# Patient Record
Sex: FEMALE | Race: WHITE | Employment: FULL TIME | URBAN - METROPOLITAN AREA
[De-identification: names, ages, dates, MRNs, and addresses within clinical notes are randomized per-mention and may not be internally consistent; named-entity substitution may affect disease eponyms.]

---

## 2018-01-03 PROBLEM — K21.00 GASTROESOPHAGEAL REFLUX DISEASE WITH ESOPHAGITIS: Status: ACTIVE | Noted: 2018-01-03

## 2018-01-03 PROBLEM — Z13.6 SCREENING FOR CARDIOVASCULAR CONDITION: Status: ACTIVE | Noted: 2018-01-03

## 2018-01-03 PROBLEM — F32.A DEPRESSION: Status: ACTIVE | Noted: 2018-01-03

## 2018-01-03 PROBLEM — R53.83 FATIGUE: Status: ACTIVE | Noted: 2018-01-03

## 2018-01-03 PROBLEM — F33.9 RECURRENT DEPRESSION (HCC): Status: ACTIVE | Noted: 2018-01-03

## 2018-07-03 PROBLEM — Z13.6 SCREENING FOR CARDIOVASCULAR CONDITION: Status: RESOLVED | Noted: 2018-01-03 | Resolved: 2018-07-03

## 2018-07-03 PROBLEM — F33.9 RECURRENT DEPRESSION (HCC): Status: RESOLVED | Noted: 2018-01-03 | Resolved: 2018-07-03

## 2018-07-03 PROBLEM — K21.00 GASTROESOPHAGEAL REFLUX DISEASE WITH ESOPHAGITIS: Status: RESOLVED | Noted: 2018-01-03 | Resolved: 2018-07-03

## 2018-07-03 PROBLEM — R00.0 TACHYCARDIA: Status: ACTIVE | Noted: 2018-07-03

## 2018-07-03 PROBLEM — Z34.90 PREGNANCY: Status: ACTIVE | Noted: 2018-07-03

## 2018-07-03 PROBLEM — R53.83 FATIGUE: Status: RESOLVED | Noted: 2018-01-03 | Resolved: 2018-07-03

## 2019-03-27 ENCOUNTER — HOSPITAL ENCOUNTER (OUTPATIENT)
Dept: CT IMAGING | Age: 29
Discharge: HOME | End: 2019-03-27
Attending: FAMILY MEDICINE
Payer: OTHER GOVERNMENT

## 2019-03-27 DIAGNOSIS — Z98.890 STATUS POST SURGERY: ICD-10-CM

## 2019-03-27 DIAGNOSIS — R50.9 FEVER, UNSPECIFIED FEVER CAUSE: ICD-10-CM

## 2019-03-27 DIAGNOSIS — R00.0 TACHYCARDIA: ICD-10-CM

## 2019-03-27 PROBLEM — F41.9 ANXIETY AND DEPRESSION: Status: ACTIVE | Noted: 2019-03-27

## 2019-03-27 PROBLEM — G47.00 INSOMNIA: Status: ACTIVE | Noted: 2019-03-27

## 2019-03-27 PROBLEM — E87.6 LOW SERUM POTASSIUM: Status: ACTIVE | Noted: 2019-03-27

## 2019-03-27 PROBLEM — F32.A ANXIETY AND DEPRESSION: Status: ACTIVE | Noted: 2019-03-27

## 2019-03-27 PROCEDURE — 74011000258 HC RX REV CODE- 258: Performed by: FAMILY MEDICINE

## 2019-03-27 PROCEDURE — 74011636320 HC RX REV CODE- 636/320: Performed by: FAMILY MEDICINE

## 2019-03-27 PROCEDURE — 71260 CT THORAX DX C+: CPT

## 2019-03-27 RX ORDER — SODIUM CHLORIDE 0.9 % (FLUSH) 0.9 %
10 SYRINGE (ML) INJECTION
Status: COMPLETED | OUTPATIENT
Start: 2019-03-27 | End: 2019-03-27

## 2019-03-27 RX ADMIN — SODIUM CHLORIDE 100 ML: 900 INJECTION, SOLUTION INTRAVENOUS at 17:41

## 2019-03-27 RX ADMIN — IOPAMIDOL 100 ML: 755 INJECTION, SOLUTION INTRAVENOUS at 17:41

## 2019-03-27 RX ADMIN — Medication 10 ML: at 17:41

## 2019-03-27 NOTE — PROGRESS NOTES
Pt reviewed with CT to rule out PE lung, due to symptoms of fever, weakness, malaise. Report shows no PE.   Will review with pt at office visit tomorrow and develop any further plan of care as may be indicated

## 2022-03-18 PROBLEM — F32.A ANXIETY AND DEPRESSION: Status: ACTIVE | Noted: 2019-03-27

## 2022-03-18 PROBLEM — F41.9 ANXIETY AND DEPRESSION: Status: ACTIVE | Noted: 2019-03-27

## 2022-03-18 PROBLEM — R00.0 TACHYCARDIA: Status: ACTIVE | Noted: 2018-07-03

## 2022-03-19 PROBLEM — F32.A DEPRESSION: Status: ACTIVE | Noted: 2018-01-03

## 2022-03-19 PROBLEM — R50.9 FEVER: Status: ACTIVE | Noted: 2019-03-27

## 2022-03-19 PROBLEM — G47.00 INSOMNIA: Status: ACTIVE | Noted: 2019-03-27

## 2022-03-19 PROBLEM — E87.6 LOW SERUM POTASSIUM: Status: ACTIVE | Noted: 2019-03-27

## 2022-03-19 PROBLEM — Z34.90 PREGNANCY: Status: ACTIVE | Noted: 2018-07-03

## 2022-03-20 PROBLEM — Z98.890 STATUS POST SURGERY: Status: ACTIVE | Noted: 2019-03-27

## 2025-08-14 ENCOUNTER — HOSPITAL ENCOUNTER (OUTPATIENT)
Dept: LAB | Age: 35
Setting detail: SPECIMEN
Discharge: HOME | End: 2025-08-16

## 2025-08-14 PROCEDURE — 86762 RUBELLA ANTIBODY: CPT

## 2025-08-14 PROCEDURE — 36415 COLL VENOUS BLD VENIPUNCTURE: CPT

## 2025-08-14 PROCEDURE — 86787 VARICELLA-ZOSTER ANTIBODY: CPT

## 2025-08-14 PROCEDURE — 86765 RUBEOLA ANTIBODY: CPT

## 2025-08-14 PROCEDURE — 86735 MUMPS ANTIBODY: CPT
